# Patient Record
Sex: MALE | NOT HISPANIC OR LATINO | Employment: FULL TIME | URBAN - METROPOLITAN AREA
[De-identification: names, ages, dates, MRNs, and addresses within clinical notes are randomized per-mention and may not be internally consistent; named-entity substitution may affect disease eponyms.]

---

## 2017-01-26 ENCOUNTER — HOSPITAL ENCOUNTER (OUTPATIENT)
Dept: CARDIOLOGY | Facility: HOSPITAL | Age: 32
Discharge: HOME | End: 2017-01-26
Attending: INTERNAL MEDICINE | Admitting: INTERNAL MEDICINE

## 2017-01-26 ENCOUNTER — HOSPITAL ENCOUNTER (OUTPATIENT)
Dept: CARDIOLOGY | Facility: HOSPITAL | Age: 32
Discharge: HOME | End: 2017-01-26
Attending: INTERNAL MEDICINE

## 2017-01-26 VITALS
SYSTOLIC BLOOD PRESSURE: 118 MMHG | OXYGEN SATURATION: 98 % | HEART RATE: 83 BPM | RESPIRATION RATE: 18 BRPM | DIASTOLIC BLOOD PRESSURE: 76 MMHG | BODY MASS INDEX: 36.45 KG/M2 | HEIGHT: 73 IN | WEIGHT: 275 LBS

## 2017-01-26 VITALS
WEIGHT: 275 LBS | BODY MASS INDEX: 36.45 KG/M2 | HEIGHT: 73 IN | HEART RATE: 83 BPM | SYSTOLIC BLOOD PRESSURE: 118 MMHG | DIASTOLIC BLOOD PRESSURE: 76 MMHG

## 2017-01-26 DIAGNOSIS — R07.2 PRECORDIAL PAIN: ICD-10-CM

## 2017-01-26 DIAGNOSIS — R94.31 ABNORMAL EKG: ICD-10-CM

## 2017-01-26 PROCEDURE — 93306 TTE W/DOPPLER COMPLETE: CPT | Performed by: INTERNAL MEDICINE

## 2017-01-26 PROCEDURE — 93018 CV STRESS TEST I&R ONLY: CPT | Performed by: INTERNAL MEDICINE

## 2017-01-26 PROCEDURE — 93016 CV STRESS TEST SUPVJ ONLY: CPT | Performed by: INTERNAL MEDICINE

## 2017-01-26 PROCEDURE — 93306 TTE W/DOPPLER COMPLETE: CPT

## 2017-01-26 PROCEDURE — 93017 CV STRESS TEST TRACING ONLY: CPT

## 2017-01-27 LAB
BH CV ECHO MEAS - ACS: 1.7 CM
BH CV ECHO MEAS - AO MAX PG (FULL): 3.8 MMHG
BH CV ECHO MEAS - AO MAX PG: 10.1 MMHG
BH CV ECHO MEAS - AO MEAN PG (FULL): 3 MMHG
BH CV ECHO MEAS - AO MEAN PG: 6 MMHG
BH CV ECHO MEAS - AO ROOT AREA (BSA CORRECTED): 1.3
BH CV ECHO MEAS - AO ROOT AREA: 8 CM^2
BH CV ECHO MEAS - AO ROOT DIAM: 3.2 CM
BH CV ECHO MEAS - AO V2 MAX: 159 CM/SEC
BH CV ECHO MEAS - AO V2 MEAN: 113 CM/SEC
BH CV ECHO MEAS - AO V2 VTI: 26.6 CM
BH CV ECHO MEAS - AVA(I,A): 2.9 CM^2
BH CV ECHO MEAS - AVA(I,D): 2.9 CM^2
BH CV ECHO MEAS - AVA(V,A): 2.2 CM^2
BH CV ECHO MEAS - AVA(V,D): 2.2 CM^2
BH CV ECHO MEAS - BSA(HAYCOCK): 2.6 M^2
BH CV ECHO MEAS - BSA: 2.5 M^2
BH CV ECHO MEAS - BZI_BMI: 36.3 KILOGRAMS/M^2
BH CV ECHO MEAS - BZI_DIASTOLIC_PRESSURE: 76 MMHG
BH CV ECHO MEAS - BZI_HEART_RATE: 83 BPM
BH CV ECHO MEAS - BZI_METRIC_HEIGHT: 185.4 CM
BH CV ECHO MEAS - BZI_METRIC_WEIGHT: 124.7 KG
BH CV ECHO MEAS - BZI_SYSTOLIC_PRESSURE: 118 MMHG
BH CV ECHO MEAS - CONTRAST EF 4CH: 54.5 ML/M^2
BH CV ECHO MEAS - EDV(CUBED): 22 ML
BH CV ECHO MEAS - EDV(MOD-SP4): 66 ML
BH CV ECHO MEAS - EDV(TEICH): 29.6 ML
BH CV ECHO MEAS - EF(CUBED): 69.2 %
BH CV ECHO MEAS - EF(MOD-SP4): 54.5 %
BH CV ECHO MEAS - EF(TEICH): 62.7 %
BH CV ECHO MEAS - ESV(CUBED): 6.8 ML
BH CV ECHO MEAS - ESV(MOD-SP4): 30 ML
BH CV ECHO MEAS - ESV(TEICH): 11 ML
BH CV ECHO MEAS - FS: 32.5 %
BH CV ECHO MEAS - IVS/LVPW: 0.92
BH CV ECHO MEAS - IVSD: 1.2 CM
BH CV ECHO MEAS - LA DIMENSION: 2.6 CM
BH CV ECHO MEAS - LA/AO: 0.81
BH CV ECHO MEAS - LAT PEAK E' VEL: 17 CM/SEC
BH CV ECHO MEAS - LV DIASTOLIC VOL/BSA (35-75): 26.8 ML/M^2
BH CV ECHO MEAS - LV MASS(C)D: 106.2 GRAMS
BH CV ECHO MEAS - LV MASS(C)DI: 43.1 GRAMS/M^2
BH CV ECHO MEAS - LV MAX PG: 6.4 MMHG
BH CV ECHO MEAS - LV MEAN PG: 3 MMHG
BH CV ECHO MEAS - LV SYSTOLIC VOL/BSA (12-30): 12.2 ML/M^2
BH CV ECHO MEAS - LV V1 MAX: 126 CM/SEC
BH CV ECHO MEAS - LV V1 MEAN: 80.6 CM/SEC
BH CV ECHO MEAS - LV V1 VTI: 26.8 CM
BH CV ECHO MEAS - LVIDD: 2.8 CM
BH CV ECHO MEAS - LVIDS: 1.9 CM
BH CV ECHO MEAS - LVLD AP4: 7.6 CM
BH CV ECHO MEAS - LVLS AP4: 6.7 CM
BH CV ECHO MEAS - LVOT AREA (M): 2.8 CM^2
BH CV ECHO MEAS - LVOT AREA: 2.8 CM^2
BH CV ECHO MEAS - LVOT DIAM: 1.9 CM
BH CV ECHO MEAS - LVPWD: 1.3 CM
BH CV ECHO MEAS - MED PEAK E' VEL: 7 CM/SEC
BH CV ECHO MEAS - MV A DUR: 0.09 SEC
BH CV ECHO MEAS - MV A MAX VEL: 91.8 CM/SEC
BH CV ECHO MEAS - MV DEC SLOPE: 395 CM/SEC^2
BH CV ECHO MEAS - MV DEC TIME: 0.2 SEC
BH CV ECHO MEAS - MV E MAX VEL: 73.1 CM/SEC
BH CV ECHO MEAS - MV E/A: 0.8
BH CV ECHO MEAS - MV MAX PG: 2.5 MMHG
BH CV ECHO MEAS - MV MEAN PG: 1 MMHG
BH CV ECHO MEAS - MV P1/2T MAX VEL: 79.5 CM/SEC
BH CV ECHO MEAS - MV P1/2T: 58.9 MSEC
BH CV ECHO MEAS - MV V2 MAX: 79 CM/SEC
BH CV ECHO MEAS - MV V2 MEAN: 52.4 CM/SEC
BH CV ECHO MEAS - MV V2 VTI: 18.6 CM
BH CV ECHO MEAS - MVA P1/2T LCG: 2.8 CM^2
BH CV ECHO MEAS - MVA(P1/2T): 3.7 CM^2
BH CV ECHO MEAS - MVA(VTI): 4.1 CM^2
BH CV ECHO MEAS - PA MAX PG: 6.7 MMHG
BH CV ECHO MEAS - PA MEAN PG: 3 MMHG
BH CV ECHO MEAS - PA V2 MAX: 127 CM/SEC
BH CV ECHO MEAS - PA V2 MEAN: 74.6 CM/SEC
BH CV ECHO MEAS - PA V2 VTI: 18 CM
BH CV ECHO MEAS - PULM A REVS DUR: 0.1 SEC
BH CV ECHO MEAS - PULM A REVS VEL: 39.4 CM/SEC
BH CV ECHO MEAS - PULM DIAS VEL: 56.3 CM/SEC
BH CV ECHO MEAS - PULM S/D: 1.6
BH CV ECHO MEAS - PULM SYS VEL: 88.3 CM/SEC
BH CV ECHO MEAS - RAP SYSTOLE: 10 MMHG
BH CV ECHO MEAS - RVSP: 26 MMHG
BH CV ECHO MEAS - SI(AO): 86.8 ML/M^2
BH CV ECHO MEAS - SI(CUBED): 6.2 ML/M^2
BH CV ECHO MEAS - SI(LVOT): 30.8 ML/M^2
BH CV ECHO MEAS - SI(MOD-SP4): 14.6 ML/M^2
BH CV ECHO MEAS - SI(TEICH): 7.5 ML/M^2
BH CV ECHO MEAS - SV(AO): 213.9 ML
BH CV ECHO MEAS - SV(CUBED): 15.2 ML
BH CV ECHO MEAS - SV(LVOT): 76 ML
BH CV ECHO MEAS - SV(MOD-SP4): 36 ML
BH CV ECHO MEAS - SV(TEICH): 18.5 ML
BH CV ECHO MEAS - TAPSE (>1.6): 2.9 CM2
BH CV ECHO MEAS - TR MAX VEL: 200 CM/SEC
BH CV STRESS BP STAGE 1: NORMAL
BH CV STRESS BP STAGE 2: NORMAL
BH CV STRESS BP STAGE 3: NORMAL
BH CV STRESS BP STAGE 4: NORMAL
BH CV STRESS DURATION MIN STAGE 1: 3
BH CV STRESS DURATION MIN STAGE 2: 3
BH CV STRESS DURATION MIN STAGE 3: 3
BH CV STRESS DURATION MIN STAGE 4: 1
BH CV STRESS DURATION SEC STAGE 1: 0
BH CV STRESS DURATION SEC STAGE 2: 0
BH CV STRESS DURATION SEC STAGE 3: 0
BH CV STRESS DURATION SEC STAGE 4: 1
BH CV STRESS GRADE STAGE 1: 10
BH CV STRESS GRADE STAGE 2: 12
BH CV STRESS GRADE STAGE 3: 14
BH CV STRESS GRADE STAGE 4: 16
BH CV STRESS HR STAGE 1: 130
BH CV STRESS HR STAGE 2: 153
BH CV STRESS HR STAGE 3: 176
BH CV STRESS HR STAGE 4: 166
BH CV STRESS METS STAGE 1: 4.6
BH CV STRESS METS STAGE 2: 7
BH CV STRESS METS STAGE 3: 10
BH CV STRESS METS STAGE 4: 11.7
BH CV STRESS O2 STAGE 4: 98
BH CV STRESS PROTOCOL 1: NORMAL
BH CV STRESS RECOVERY BP: NORMAL MMHG
BH CV STRESS RECOVERY HR: 110 BPM
BH CV STRESS RECOVERY O2: 98 %
BH CV STRESS SPEED STAGE 1: 1.7
BH CV STRESS SPEED STAGE 2: 2.5
BH CV STRESS SPEED STAGE 3: 3.4
BH CV STRESS SPEED STAGE 4: 4.2
BH CV STRESS STAGE 1: 1
BH CV STRESS STAGE 2: 2
BH CV STRESS STAGE 3: 3
BH CV STRESS STAGE 4: 4
BH CV XLRA - RV BASE: 2.5 CM
BH CV XLRA - RV LENGTH: 7.7 CM
BH CV XLRA - RV MID: 2.3 CM
E/E' RATIO: 7
LEFT ATRIUM VOLUME INDEX: 23 ML/M2
MAXIMAL PREDICTED HEART RATE: 191 BPM
PERCENT MAX PREDICTED HR: 86.91 %
STRESS BASELINE BP: NORMAL MMHG
STRESS BASELINE HR: 83 BPM
STRESS O2 SAT REST: 98 %
STRESS PERCENT HR: 102 %
STRESS POST ESTIMATED WORKLOAD: 11.7 METS
STRESS POST EXERCISE DUR MIN: 10 MIN
STRESS POST EXERCISE DUR SEC: 1 SEC
STRESS POST O2 SAT PEAK: 98 %
STRESS POST PEAK BP: NORMAL MMHG
STRESS POST PEAK HR: 166 BPM
STRESS TARGET HR: 162 BPM

## 2017-02-14 ENCOUNTER — OFFICE VISIT (OUTPATIENT)
Dept: CARDIOLOGY | Facility: CLINIC | Age: 32
End: 2017-02-14

## 2017-02-14 VITALS
DIASTOLIC BLOOD PRESSURE: 82 MMHG | OXYGEN SATURATION: 97 % | HEART RATE: 67 BPM | HEIGHT: 73 IN | WEIGHT: 262 LBS | SYSTOLIC BLOOD PRESSURE: 108 MMHG | BODY MASS INDEX: 34.72 KG/M2

## 2017-02-14 DIAGNOSIS — R07.2 PRECORDIAL PAIN: Primary | ICD-10-CM

## 2017-02-14 DIAGNOSIS — R94.31 ABNORMAL EKG: ICD-10-CM

## 2017-02-14 PROCEDURE — 99213 OFFICE O/P EST LOW 20 MIN: CPT | Performed by: INTERNAL MEDICINE

## 2017-02-14 RX ORDER — VARENICLINE TARTRATE 1 MG/1
0.5 TABLET, FILM COATED ORAL DAILY
COMMUNITY
Start: 2016-11-14

## 2017-02-14 NOTE — PROGRESS NOTES
Subjective:       Mustapha Pagan is a 31 y.o. male who here for follow up    CC  Follow-up after the stress test and echocardiogram  HPI  31-year-old male with a strong family history with atypical symptoms underwent a stress test and echocardiogram, symptoms as markedly improved with no more chest pains or tightness in chest, patient had a stress test and echo done couple weeks ago     Problem List Items Addressed This Visit        Nervous and Auditory    Precordial pain - Primary       Other    Abnormal EKG        Previous treatments/evaluations include: . Cardiac risk factors: .    The following portions of the patient's history were reviewed and updated as appropriate: allergies, current medications, past family history, past medical history, past social history, past surgical history and problem list.    Past Medical History   Diagnosis Date   • Abnormal ECG    • Back pain    • LFT elevation    • Low HDL (under 40)    • Mood swings    • Seasonal allergies    • Skull fracture     reports that he has been smoking.  He has a 7.00 pack-year smoking history. He has never used smokeless tobacco. He reports that he drinks alcohol. He reports that he does not use illicit drugs.  Family History   Problem Relation Age of Onset   • Heart disease Sister    • Heart disease Brother    • Seizures Brother    • Cancer Maternal Aunt    • Heart disease Maternal Grandfather    • Heart disease Paternal Grandfather    • Hypertension Mother    • Hyperlipidemia Mother    • Hypertension Father    • COPD Father    • Heart disease Paternal Uncle    • Heart failure Paternal Uncle    • Hyperlipidemia Paternal Uncle    • Hypertension Paternal Uncle        Review of Systems  Constitutional: No wt loss, fever, fatigue  Gastrointestinal: No nausea, abdominal pain  Behavioral/Psych: No insomnia or anxiety   Cardiovascular chest pain  Objective:       Physical Exam           Physical Exam  Visit Vitals   • /82 (BP Location: Right arm)  "  • Pulse 67   • Ht 73\" (185.4 cm)   • Wt 262 lb (119 kg)   • SpO2 97%   • BMI 34.57 kg/m2       General appearance: NAD, conversant   Eyes: anicteric sclerae, moist conjunctivae; no lid-lag; PERRLA   HENT: Atraumatic; oropharynx clear with moist mucous membranes and no mucosal ulcerations;  normal hard and soft palate   Neck: Trachea midline; FROM, supple, no thyromegaly or lymphadenopathy   Lungs: CTA, with normal respiratory effort and no intercostal retractions   CV: S1-S2 regular, no murmurs, no rub, no gallop   Abdomen: Soft, non-tender; no masses or HSM   Extremities: No peripheral edema or extremity lymphadenopathy  Skin: Normal temperature, turgor and texture; no rash, ulcers or subcutaneous nodules   Psych: Appropriate affect, alert and oriented to person, place and time           Cardiographics  @Procedures    Echocardiogram:        Current Outpatient Prescriptions:   •  CHANTIX CONTINUING MONTH KAITLYN 1 MG tablet, Take 0.5 mg by mouth Daily., Disp: , Rfl:   •  CHANTIX STARTING MONTH KAITLYN 0.5 MG X 11 & 1 MG X 42 tablet, Take 1 mg by mouth Every 12 (Twelve) Hours., Disp: , Rfl:    Assessment:        Patient Active Problem List   Diagnosis   • Abnormal EKG   • Precordial pain   Asymptomatic for chest pain. ECG is negative for ischemia.   Ectopy:None   B/P response Hypertensive, returns to baseline in recovery  Exercise tolerance is good.   Goodwin treadmill score 9 Estimates an annual cardiovascular mortality of 0 % And of 5-year survival of 96 % . Using the Duke score there is a low probability of angiographic coronary disease      Interpretation Summary   · All left ventricular wall segments contract normally.  · Left Ventricle: Calculated EF = 54.5%.  · Trace-to-mild mitral valve regurgitation  · There is no evidence of pericardial effusion   Specificity and sensitivity of the stress test has been explained. Pt has been explained if  Symptoms continue please go to ER, and further w/p will be required.          " Plan:            ICD-10-CM ICD-9-CM   1. Precordial pain R07.2 786.51   2. Abnormal EKG R94.31 794.31     Cp much better  No further w/p at this stage     See us 2-5 yrs      COUNSELING:    Mustapha Crespo was given to patient for the following topics: diagnostic results, risk factor reductions, impressions, risks and benefits of treatment options and importance of treatment compliance .       SMOKING COUNSELING:    Ready to quit: Not Answered  Counseling given: Not Answered      EMR Dragon/Transcription disclaimer:   Much of this encounter note is an electronic transcription/translation of spoken language to printed text. The electronic translation of spoken language may permit erroneous, or at times, nonsensical words or phrases to be inadvertently transcribed; Although I have reviewed the note for such errors, some may still exist.

## 2019-01-16 ENCOUNTER — OFFICE VISIT CONVERTED (OUTPATIENT)
Dept: FAMILY MEDICINE CLINIC | Facility: CLINIC | Age: 34
End: 2019-01-16
Attending: FAMILY MEDICINE

## 2019-01-22 ENCOUNTER — HOSPITAL ENCOUNTER (OUTPATIENT)
Dept: GENERAL RADIOLOGY | Facility: HOSPITAL | Age: 34
Discharge: HOME | End: 2019-01-22
Attending: FAMILY MEDICINE

## 2019-02-06 ENCOUNTER — OFFICE VISIT CONVERTED (OUTPATIENT)
Dept: FAMILY MEDICINE CLINIC | Facility: CLINIC | Age: 34
End: 2019-02-06
Attending: FAMILY MEDICINE

## 2019-04-09 ENCOUNTER — HOSPITAL ENCOUNTER (OUTPATIENT)
Dept: FAMILY MEDICINE CLINIC | Facility: CLINIC | Age: 34
Discharge: HOME | End: 2019-04-09
Attending: FAMILY MEDICINE

## 2019-04-09 ENCOUNTER — CONVERSION ENCOUNTER (OUTPATIENT)
Dept: OTHER | Facility: HOSPITAL | Age: 34
End: 2019-04-09

## 2019-04-09 LAB
ALBUMIN SERPL-MCNC: 4.2 G/DL (ref 3.5–5)
ALBUMIN/GLOB SERPL: 1.5 {RATIO} (ref 1.4–2.6)
ALP SERPL-CCNC: 154 U/L (ref 53–128)
ALT SERPL-CCNC: 70 U/L (ref 10–40)
ANION GAP SERPL CALC-SCNC: 15 MMOL/L (ref 8–19)
APPEARANCE UR: CLEAR
AST SERPL-CCNC: 41 U/L (ref 15–50)
BASOPHILS # BLD AUTO: 0.07 10*3/UL (ref 0–0.2)
BASOPHILS NFR BLD AUTO: 1 % (ref 0–3)
BILIRUB SERPL-MCNC: 0.38 MG/DL (ref 0.2–1.3)
BILIRUB UR QL: NEGATIVE
BUN SERPL-MCNC: 13 MG/DL (ref 5–25)
BUN/CREAT SERPL: 10 {RATIO} (ref 6–20)
CALCIUM SERPL-MCNC: 9 MG/DL (ref 8.7–10.4)
CHLORIDE SERPL-SCNC: 104 MMOL/L (ref 99–111)
CHOLEST SERPL-MCNC: 169 MG/DL (ref 107–200)
CHOLEST/HDLC SERPL: 6.8 {RATIO} (ref 3–6)
COLOR UR: YELLOW
CONV ABS IMM GRAN: 0.01 10*3/UL (ref 0–0.2)
CONV CO2: 25 MMOL/L (ref 22–32)
CONV COLLECTION SOURCE (UA): NORMAL
CONV HIV-1/ HIV-2: NEGATIVE
CONV IMMATURE GRAN: 0.1 % (ref 0–1.8)
CONV TOTAL PROTEIN: 7 G/DL (ref 6.3–8.2)
CONV UROBILINOGEN IN URINE BY AUTOMATED TEST STRIP: 0.2 {EHRLICHU}/DL (ref 0.1–1)
CREAT UR-MCNC: 1.25 MG/DL (ref 0.7–1.2)
DEPRECATED RDW RBC AUTO: 44 FL (ref 35.1–43.9)
EOSINOPHIL # BLD AUTO: 0.34 10*3/UL (ref 0–0.7)
EOSINOPHIL # BLD AUTO: 4.9 % (ref 0–7)
ERYTHROCYTE [DISTWIDTH] IN BLOOD BY AUTOMATED COUNT: 13.2 % (ref 11.6–14.4)
ERYTHROCYTE [SEDIMENTATION RATE] IN BLOOD: 6 MM/H (ref 0–20)
EST. AVERAGE GLUCOSE BLD GHB EST-MCNC: 137 MG/DL
GFR SERPLBLD BASED ON 1.73 SQ M-ARVRAT: >60 ML/MIN/{1.73_M2}
GLOBULIN UR ELPH-MCNC: 2.8 G/DL (ref 2–3.5)
GLUCOSE SERPL-MCNC: 126 MG/DL (ref 70–99)
GLUCOSE UR QL: NEGATIVE MG/DL
HBA1C MFR BLD: 15.2 G/DL (ref 14–18)
HBA1C MFR BLD: 6.4 % (ref 3.5–5.7)
HCT VFR BLD AUTO: 45.1 % (ref 42–52)
HDLC SERPL-MCNC: 25 MG/DL (ref 40–60)
HGB UR QL STRIP: NEGATIVE
KETONES UR QL STRIP: NEGATIVE MG/DL
LDLC SERPL CALC-MCNC: 94 MG/DL (ref 70–100)
LEUKOCYTE ESTERASE UR QL STRIP: NEGATIVE
LYMPHOCYTES # BLD AUTO: 2.66 10*3/UL (ref 1–5)
MCH RBC QN AUTO: 30.8 PG (ref 27–31)
MCHC RBC AUTO-ENTMCNC: 33.7 G/DL (ref 33–37)
MCV RBC AUTO: 91.3 FL (ref 80–96)
MONOCYTES # BLD AUTO: 0.53 10*3/UL (ref 0.2–1.2)
MONOCYTES NFR BLD AUTO: 7.6 % (ref 3–10)
NEUTROPHILS # BLD AUTO: 3.39 10*3/UL (ref 2–8)
NEUTROPHILS NFR BLD AUTO: 48.4 % (ref 30–85)
NITRITE UR QL STRIP: NEGATIVE
NRBC CBCN: 0 % (ref 0–0.7)
OSMOLALITY SERPL CALC.SUM OF ELEC: 292 MOSM/KG (ref 273–304)
PH UR STRIP.AUTO: 5 [PH] (ref 5–8)
PLATELET # BLD AUTO: 143 10*3/UL (ref 130–400)
PMV BLD AUTO: 11.2 FL (ref 9.4–12.4)
POTASSIUM SERPL-SCNC: 3.9 MMOL/L (ref 3.5–5.3)
PROT UR QL: NEGATIVE MG/DL
RBC # BLD AUTO: 4.94 10*6/UL (ref 4.7–6.1)
SODIUM SERPL-SCNC: 140 MMOL/L (ref 135–147)
SP GR UR: 1.02 (ref 1–1.03)
TRIGL SERPL-MCNC: 248 MG/DL (ref 40–150)
TSH SERPL-ACNC: 2.22 M[IU]/L (ref 0.27–4.2)
VARIANT LYMPHS NFR BLD MANUAL: 38 % (ref 20–45)
VLDLC SERPL-MCNC: 50 MG/DL (ref 5–37)
WBC # BLD AUTO: 7 10*3/UL (ref 4.8–10.8)

## 2019-04-16 LAB
CONV HEPATITIS B SURFACE AG W CONFIRMATION RE: NEGATIVE
CONV HEPATITIS C TEST INFO: NORMAL
HAV IGM SERPL QL IA: NEGATIVE
HBV CORE IGM SERPL QL IA: NEGATIVE
HCV AB SER DONR QL: <0.1 S/CO RATIO (ref 0–0.9)
HCV RNA SERPL NAA+PROBE-ACNC: NORMAL IU/ML

## 2019-04-24 ENCOUNTER — OFFICE VISIT CONVERTED (OUTPATIENT)
Dept: FAMILY MEDICINE CLINIC | Facility: CLINIC | Age: 34
End: 2019-04-24
Attending: FAMILY MEDICINE

## 2019-04-30 ENCOUNTER — HOSPITAL ENCOUNTER (OUTPATIENT)
Dept: NEUROLOGY | Facility: HOSPITAL | Age: 34
Discharge: HOME | End: 2019-04-30

## 2019-06-05 ENCOUNTER — OFFICE VISIT CONVERTED (OUTPATIENT)
Dept: FAMILY MEDICINE CLINIC | Facility: CLINIC | Age: 34
End: 2019-06-05
Attending: FAMILY MEDICINE

## 2020-02-25 ENCOUNTER — OFFICE VISIT CONVERTED (OUTPATIENT)
Dept: FAMILY MEDICINE CLINIC | Facility: CLINIC | Age: 35
End: 2020-02-25
Attending: FAMILY MEDICINE

## 2020-02-25 ENCOUNTER — CONVERSION ENCOUNTER (OUTPATIENT)
Dept: FAMILY MEDICINE CLINIC | Facility: CLINIC | Age: 35
End: 2020-02-25

## 2020-03-09 ENCOUNTER — HOSPITAL ENCOUNTER (OUTPATIENT)
Dept: FAMILY MEDICINE CLINIC | Facility: CLINIC | Age: 35
Discharge: HOME | End: 2020-03-09
Attending: FAMILY MEDICINE

## 2020-03-09 LAB
ALBUMIN SERPL-MCNC: 4.4 G/DL (ref 3.5–5)
ALBUMIN/GLOB SERPL: 1.5 {RATIO} (ref 1.4–2.6)
ALP SERPL-CCNC: 147 U/L (ref 53–128)
ALT SERPL-CCNC: 69 U/L (ref 10–40)
ANION GAP SERPL CALC-SCNC: 15 MMOL/L (ref 8–19)
AST SERPL-CCNC: 38 U/L (ref 15–50)
BILIRUB SERPL-MCNC: 0.59 MG/DL (ref 0.2–1.3)
BUN SERPL-MCNC: 12 MG/DL (ref 5–25)
BUN/CREAT SERPL: 10 {RATIO} (ref 6–20)
CALCIUM SERPL-MCNC: 9.4 MG/DL (ref 8.7–10.4)
CHLORIDE SERPL-SCNC: 103 MMOL/L (ref 99–111)
CHOLEST SERPL-MCNC: 162 MG/DL (ref 107–200)
CHOLEST/HDLC SERPL: 7 {RATIO} (ref 3–6)
CONV CO2: 27 MMOL/L (ref 22–32)
CONV TOTAL PROTEIN: 7.4 G/DL (ref 6.3–8.2)
CREAT UR-MCNC: 1.18 MG/DL (ref 0.7–1.2)
EST. AVERAGE GLUCOSE BLD GHB EST-MCNC: 146 MG/DL
GFR SERPLBLD BASED ON 1.73 SQ M-ARVRAT: >60 ML/MIN/{1.73_M2}
GLOBULIN UR ELPH-MCNC: 3 G/DL (ref 2–3.5)
GLUCOSE SERPL-MCNC: 123 MG/DL (ref 70–99)
HBA1C MFR BLD: 6.7 % (ref 3.5–5.7)
HDLC SERPL-MCNC: 23 MG/DL (ref 40–60)
LDLC SERPL CALC-MCNC: 105 MG/DL (ref 70–100)
OSMOLALITY SERPL CALC.SUM OF ELEC: 293 MOSM/KG (ref 273–304)
POTASSIUM SERPL-SCNC: 4.1 MMOL/L (ref 3.5–5.3)
SODIUM SERPL-SCNC: 141 MMOL/L (ref 135–147)
TRIGL SERPL-MCNC: 171 MG/DL (ref 40–150)
VLDLC SERPL-MCNC: 34 MG/DL (ref 5–37)

## 2020-03-10 ENCOUNTER — OFFICE VISIT CONVERTED (OUTPATIENT)
Dept: FAMILY MEDICINE CLINIC | Facility: CLINIC | Age: 35
End: 2020-03-10
Attending: FAMILY MEDICINE

## 2020-06-16 ENCOUNTER — OFFICE VISIT CONVERTED (OUTPATIENT)
Dept: FAMILY MEDICINE CLINIC | Facility: CLINIC | Age: 35
End: 2020-06-16
Attending: FAMILY MEDICINE

## 2020-08-05 ENCOUNTER — OFFICE VISIT CONVERTED (OUTPATIENT)
Dept: FAMILY MEDICINE CLINIC | Facility: CLINIC | Age: 35
End: 2020-08-05
Attending: FAMILY MEDICINE

## 2020-08-05 ENCOUNTER — HOSPITAL ENCOUNTER (OUTPATIENT)
Dept: FAMILY MEDICINE CLINIC | Facility: CLINIC | Age: 35
Discharge: HOME | End: 2020-08-05
Attending: FAMILY MEDICINE

## 2020-08-05 LAB
ALBUMIN SERPL-MCNC: 4.3 G/DL (ref 3.5–5)
ALBUMIN/GLOB SERPL: 1.4 {RATIO} (ref 1.4–2.6)
ALP SERPL-CCNC: 184 U/L (ref 53–128)
ALT SERPL-CCNC: 45 U/L (ref 10–40)
ANION GAP SERPL CALC-SCNC: 14 MMOL/L (ref 8–19)
AST SERPL-CCNC: 30 U/L (ref 15–50)
BASOPHILS # BLD AUTO: 0.09 10*3/UL (ref 0–0.2)
BASOPHILS NFR BLD AUTO: 1.2 % (ref 0–3)
BILIRUB SERPL-MCNC: 0.35 MG/DL (ref 0.2–1.3)
BUN SERPL-MCNC: 7 MG/DL (ref 5–25)
BUN/CREAT SERPL: 6 {RATIO} (ref 6–20)
CALCIUM SERPL-MCNC: 9.7 MG/DL (ref 8.7–10.4)
CHLORIDE SERPL-SCNC: 104 MMOL/L (ref 99–111)
CONV ABS IMM GRAN: 0.01 10*3/UL (ref 0–0.2)
CONV CO2: 28 MMOL/L (ref 22–32)
CONV CREATININE URINE, RANDOM: 477 MG/DL (ref 10–300)
CONV IMMATURE GRAN: 0.1 % (ref 0–1.8)
CONV MICROALBUM.,U,RANDOM: <12 MG/L (ref 0–20)
CONV TOTAL PROTEIN: 7.3 G/DL (ref 6.3–8.2)
CREAT UR-MCNC: 1.19 MG/DL (ref 0.7–1.2)
DEPRECATED RDW RBC AUTO: 46.1 FL (ref 35.1–43.9)
EOSINOPHIL # BLD AUTO: 0.22 10*3/UL (ref 0–0.7)
EOSINOPHIL # BLD AUTO: 3 % (ref 0–7)
ERYTHROCYTE [DISTWIDTH] IN BLOOD BY AUTOMATED COUNT: 13.7 % (ref 11.6–14.4)
GFR SERPLBLD BASED ON 1.73 SQ M-ARVRAT: >60 ML/MIN/{1.73_M2}
GLOBULIN UR ELPH-MCNC: 3 G/DL (ref 2–3.5)
GLUCOSE SERPL-MCNC: 120 MG/DL (ref 70–99)
HCT VFR BLD AUTO: 48.3 % (ref 42–52)
HGB BLD-MCNC: 15.8 G/DL (ref 14–18)
LYMPHOCYTES # BLD AUTO: 1.95 10*3/UL (ref 1–5)
LYMPHOCYTES NFR BLD AUTO: 26.5 % (ref 20–45)
MCH RBC QN AUTO: 30 PG (ref 27–31)
MCHC RBC AUTO-ENTMCNC: 32.7 G/DL (ref 33–37)
MCV RBC AUTO: 91.8 FL (ref 80–96)
MICROALBUMIN/CREAT UR: 2.5 MG/G{CRE} (ref 0–25)
MONOCYTES # BLD AUTO: 0.5 10*3/UL (ref 0.2–1.2)
MONOCYTES NFR BLD AUTO: 6.8 % (ref 3–10)
NEUTROPHILS # BLD AUTO: 4.59 10*3/UL (ref 2–8)
NEUTROPHILS NFR BLD AUTO: 62.4 % (ref 30–85)
NRBC CBCN: 0 % (ref 0–0.7)
OSMOLALITY SERPL CALC.SUM OF ELEC: 293 MOSM/KG (ref 273–304)
PLATELET # BLD AUTO: 162 10*3/UL (ref 130–400)
PMV BLD AUTO: 11.5 FL (ref 9.4–12.4)
POTASSIUM SERPL-SCNC: 3.9 MMOL/L (ref 3.5–5.3)
RBC # BLD AUTO: 5.26 10*6/UL (ref 4.7–6.1)
SODIUM SERPL-SCNC: 142 MMOL/L (ref 135–147)
WBC # BLD AUTO: 7.36 10*3/UL (ref 4.8–10.8)

## 2020-08-06 LAB
CHOLEST SERPL-MCNC: 159 MG/DL (ref 107–200)
CHOLEST/HDLC SERPL: 4.7 {RATIO} (ref 3–6)
EST. AVERAGE GLUCOSE BLD GHB EST-MCNC: 114 MG/DL
GGT SERPL-CCNC: 46 U/L (ref 8–78)
HBA1C MFR BLD: 5.6 % (ref 3.5–5.7)
HBV SURFACE AB SER QL: NON REACTIVE
HDLC SERPL-MCNC: 34 MG/DL (ref 40–60)
LDLC SERPL CALC-MCNC: 108 MG/DL (ref 70–100)
TRIGL SERPL-MCNC: 87 MG/DL (ref 40–150)
VLDLC SERPL-MCNC: 17 MG/DL (ref 5–37)

## 2020-08-21 ENCOUNTER — HOSPITAL ENCOUNTER (OUTPATIENT)
Dept: URGENT CARE | Facility: CLINIC | Age: 35
Discharge: HOME | End: 2020-08-21

## 2020-08-23 LAB — SARS-COV-2 RNA SPEC QL NAA+PROBE: NOT DETECTED

## 2020-09-01 ENCOUNTER — CONVERSION ENCOUNTER (OUTPATIENT)
Dept: OTHER | Facility: HOSPITAL | Age: 35
End: 2020-09-01

## 2020-09-01 ENCOUNTER — OFFICE VISIT CONVERTED (OUTPATIENT)
Dept: NEUROLOGY | Facility: CLINIC | Age: 35
End: 2020-09-01
Attending: NURSE PRACTITIONER

## 2020-09-01 ENCOUNTER — HOSPITAL ENCOUNTER (OUTPATIENT)
Dept: URGENT CARE | Facility: CLINIC | Age: 35
Discharge: HOME | End: 2020-09-01
Attending: PHYSICIAN ASSISTANT

## 2020-09-04 LAB — SARS-COV-2 RNA SPEC QL NAA+PROBE: NOT DETECTED

## 2020-09-22 ENCOUNTER — HOSPITAL ENCOUNTER (OUTPATIENT)
Dept: MRI IMAGING | Facility: HOSPITAL | Age: 35
Discharge: HOME | End: 2020-09-22
Attending: NURSE PRACTITIONER

## 2020-10-22 ENCOUNTER — HOSPITAL ENCOUNTER (OUTPATIENT)
Dept: NEUROLOGY | Facility: HOSPITAL | Age: 35
Discharge: HOME | End: 2020-10-22
Attending: NURSE PRACTITIONER

## 2021-05-13 NOTE — PROGRESS NOTES
Progress Note      Patient Name: Mustapha Pagan II   Patient ID: [id number]   Sex: Male   YOB: 1985    Primary Care Provider: Mitesh Wiley DO   Referring Provider: Mitesh Wiley DO    Visit Date: August 5, 2020    Provider: Mitesh Wiley DO   Location: [facility]   Location Address: [address] [place] [address], [address]  [id number]   Location Phone: [phone]          Chief Complaint  · 1 mo      History Of Present Illness  Mustapha Pagan II is a 35 year old /White male who presents for evaluation and treatment of:      For 1 month follow-up.  He is still not gotten his labs done which we will get done today prior to him leaving.  He continues to lose weight which is intentional.  He is down another 14 pounds and overall is down from 308 pounds down to 255.  He still working on losing more weight.  He is feeling better.  Unfortunately 1 week ago on 7/29/2020 around 10 PM when he was sitting on the couch resting.  He admits he was under a lot of stress.  He reports that the next thing he knew he was being woken up on his side.  He reports that witnesses saw that he was shaking uncontrollably for about 2 minutes.  This is not previously happened to him but he has had staring spells.  Due to the staring spell concern he previously was seen by Dr. Petty in [place].  He had evaluation for this including an MRI with and without contrast that was normal.  He also had an EEG which was normal.  This is new for him.  He has not had any recurrence of this.  I discussed with him referral again to neurology to reassess this as these are new symptoms for him.  I discussed with him in the meantime he is to not drive until he has been evaluated by neurology and has received the recommendations.  He does have a history of a TBI.       Past Medical History  Allergies; Colon cancer screening; Forgetfulness; Head injury; Hemorrhoids; Migraine headache; Shortness of Breath  "        Past Surgical History  Gallbladder removal         Allergy List  NO KNOWN DRUG ALLERGIES         Family Medical History  Heart Disease; Diabetes         Social History  Alcohol (Current some day); Tobacco (Current every day)         Immunizations  Name Date Admin   Influenza Refused         Review of Systems     General: Denies any fever, chills.  Intentional weight loss  HEENT:  Denies any vision or hearing changes. Denies any neck tenderness. Denies any headaches. Denies nasal congestion  Cardiovascular: Denies any chest pain or palpitations  respiratory: Denies any cough or wheezing. Denies any shortness of breath  Gastrointestinal: Denies any nausea vomiting or diarrhea, Denies constipation  Extremities: Denies any edema  Psychiatric: Denies any changes in mood or affect  Neurologic: As discussed above  skin: Denies any rashes or lesions.  Musculoskeletal: Denies any weakness       Vitals  Date Time BP Position Site L\R Cuff Size HR RR TEMP (F) WT  HT  BMI kg/m2 BSA m2 O2 Sat HC       08/05/2020 10:49 /82 Sitting    64 - R  97.6 255lbs 0oz 6'  1\" 33.64 2.44 100 %          Physical Examination     General: AAO 3, no acute distress, pleasant  HEENT: Normocephalic, atraumatic  Cardiovascular: Regular rate and rhythm without appreciable murmur  Respiratory: Clear to auscultation bilaterally no RRW  Gastrointestinal: Soft nontender nondistended with bowel sounds present  extremities: No clubbing, cyanosis or edema  Neurologic: CN II through XII grossly intact   Psychiatric: Normal mood and affect           Assessment  · Seizure disorder     345.90/G40.909  · History of seizure     V12.49/Z87.898  · TBI (traumatic brain injury)     854.00/S06.9X9A    Problems Reconciled  Plan  · Orders  o ACO-39: Current medications updated and reviewed () - - 08/05/2020  o NEUROLOGY CONSULTATION. (NEURO) - 345.90/G40.909, V12.49/Z87.898 - 08/05/2020   Seizure last tuesday 7/29/2020  · Medications  o Medications " have been Reconciled  o Transition of Care or Provider Policy  · Instructions  o Patient was educated/instructed on their diagnosis, treatment and medications prior to discharge from the clinic today.  o Patient instructed to seek medical attention urgently for new or worsening symptoms.  o Call the office with any concerns or questions.  o Plan as documented above. Patient to call with any questions or concerns. I will see him back in 1 month or sooner if needed. Patient instructed to not drive until he has been seen by neurology. We will hold off on adding medication at this time but if he does have a recurrence he is instructed to call. We can put him on Topamax at that time.  · Disposition  o Follow Up in 1 month.            Electronically Signed by: Mitesh Wiley DO - on August 5, 2020 11:59:23 AM

## 2021-05-13 NOTE — PROGRESS NOTES
Progress Note      Patient Name: Mustapha Pagan II   Patient ID: [id number]   Sex: Male   YOB: 1985    Primary Care Provider: Mitesh Wiley DO   Referring Provider: Mitesh Wiley DO    Visit Date: September 1, 2020    Provider: CRISTIANA Gutierrez   Location: [facility]   Location Address: [address] [place] [address]  [address] [place] [address]  [id number]   Location Phone: [phone]          Chief Complaint  · Follow Up Exam      History Of Present Illness  Mustapha Pagan II is a 35 year old /White male who presents today to [facility] today for a follow up exam. Had previously been seen in this office by Dr. Petty for staring spells. EEG/MRI was normal. States about 1 month ago he was sitting on his brothers porch and when his brother looked back he was convulsing. Arm was tense and head was shaking. Spell lasted about 1 minute. Endorses post-ictal confusion. Denies urinary incontinence or tongue biting. Having headache about 4 times per week. Denies photophobia, phonophobia or nausea with headaches. Headaches last hours to all day. Previously was on Effexor and he felt that helped to decrease headaches, but he states he wasn't taking it regularly while working out of town and it was discontinued. States that his dad and brother have history of seizure and are on Keppra.       Past Medical History  Allergies; Colon cancer screening; Forgetfulness; Head injury; Hemorrhoids; Migraine headache; Shortness of Breath         Past Surgical History  Gallbladder removal         Allergy List  NO KNOWN DRUG ALLERGIES       Allergies Reconciled  Family Medical History  Heart Disease; Diabetes         Social History  Alcohol (Current some day); Tobacco (Current every day)         Immunizations  Name Date Admin   Influenza Refused         Review of Systems  · Constitutional  o Admits  o : excessive sweating, fatigue, weight loss  o Denies  o : chills, fever,  "sycope/passing out, weight gain  · Eyes  o Admits  o : blurry vision  o Denies  o : changes in vision, double vision  · HENT  o Admits  o : ringing in the ears, sore throat, seasonal allergies  o Denies  o : loss of hearing, ear aches, nasal congestion, sinus pain, nose bleeds  · Cardiovascular  o Denies  o : blood clots, swollen legs, anemia, easy burising or bleeding, transfusions  · Respiratory  o Admits  o : dry cough  o Denies  o : shortness of breath, productive cough, pneumonia, COPD  · Gastrointestinal  o Denies  o : difficulty swallowing, reflux  · Genitourinary  o Denies  o : incontinence  · Neurologic  o Admits  o : headache, seizure, loss of balance, dizziness/vertigo, difficulty with sleep, weakness  o Denies  o : stroke, tremor, falls, numbness/tingling/paresthesia , difficulty with coordination, difficulty with dexterity  · Musculoskeletal  o Admits  o : muscle aches, weakness, spasms, pain radiating in arm  o Denies  o : neck stiffness/pain, swollen lymph nodes, joint pain, sciatica, pain radiating in leg, low back pain  · Endocrine  o Denies  o : diabetes, thyroid disorder  · Psychiatric  o Admits  o : anxiety  o Denies  o : depression      Vitals  Date Time BP Position Site L\R Cuff Size HR RR TEMP (F) WT  HT  BMI kg/m2 BSA m2 O2 Sat        09/01/2020 12:58 /86 Sitting    69 - R  97.1 259lbs 7oz 6'  1\" 34.23 2.46           Physical Examination  · Constitutional  o Appearance  o : well-nourished, well groomed, in no apparent distress  · Neurologic  o Mental Status Examination  o :   § Orientation  § : Alert and oriented to person, place, and time.   § Speech/Language  § : intact naming, comprehension, and repetition. No dysarthria.  § Fund of Knowledge  § : Adequate fund of knowledge.  o Cranial Nerves  o : Pupils are equal, round and reactive to light. Extraocular movements are intact. Visual fields are full. Fundoscopic examination reveals sharp disc bilaterally. Sensation in the V1-V3 " distribution is intact and symmetric. Muscles of mastication are strong and symmetric. Muscles of facial expression are strong and symmetric. Hearing is intact. Palatal raise is intact and symmetric. Uvula is midline. Shoulder shrug is strong. Tongue protrudes in the midline.  o Reflexes  o : 2+ reflexes throughout and symmetric. Negative Staley. Negative Babinski.   o Sensation  o : Intact sensation to light touch, pinprick, vibration and proprioception throughout  o Gait and Station  o : The patient is noted to have a normal, narrow based gait with normal arm swing  o Cerebellar Function  o : intact finger to nose and heel to shin. Rapid alternating movements are intact in the upper and lower extremities.           Assessment  · Seizure-like activity     780.39/R56.9  Will order EEG and MRI brain for further evaluation of seizure-like activity. Discussed routine seizure precautions. Discussed [place] state law regarding no driving for 90 days after a seizure.   · History of traumatic brain injury     V15.52/Z87.820  · Chronic headaches     784.0/R51  Will workup for seizures, then can address chronic migraines in follow-up.     Problems Reconciled  Plan  · Orders  o MRI brain wo then w contrast (21674) - 780.39/R56.9, V15.52/Z87.820, 784.0/R51 - 09/01/2020  o EEG (Sleep Deprived) [facility] ([id number]) - 780.39/R56.9, V15.52/Z87.820, 784.0/R51 - 09/01/2020  · Medications  o Medications have been Reconciled  o Transition of Care or Provider Policy  · Instructions  o Call or Return if symptoms worsen or persist.   o Follow up in 2 months.  · Disposition  o Call or Return if symptoms worsen or persist.            Electronically Signed by: Abi Hay APRN -Author on September 2, 2020 09:22:08 AM

## 2021-05-13 NOTE — PROGRESS NOTES
Progress Note      Patient Name: Mustapha Pagan II   Patient ID: [id number]   Sex: Male   YOB: 1985    Primary Care Provider: Mitesh Wiley DO   Referring Provider: Mitesh Wiley DO    Visit Date: June 16, 2020    Provider: Mitesh Wiley DO   Location: [facility]   Location Address: [address] [place] [address], [address]  [id number]   Location Phone: [phone]          Chief Complaint  · check up      History Of Present Illness  Mustapha Pagan II is a 35 year old /White male who presents for evaluation and treatment of:      Patient presents today for checkup.  Since last time I saw him he has been exercising a lot more.  He has lost 23 pounds since his last visit.  He reports feeling a lot better.  He has been off of the Effexor for about a month and reports he is doing well without it.  Denies any suicidal ideations.  Denies any issues with anger.  He has been focusing more on relaxing and listening to rain and trying to keep calm.  This has been working well for him.  Discussed with him continuing meditation exercises.  He does have labs due.  His last A1c was 6.7%.  Discussed with him that this placed him in the diabetic range.  He has been eating better.  Reports eating more turkey meat and eating more green leafy vegetables.  Discussed holding off on adding any medication at this time pending his results.  Discussed seeing him back in 1 month.       Past Medical History  Allergies; Colon cancer screening; Forgetfulness; Head injury; Hemorrhoids; Migraine headache; Shortness of Breath         Past Surgical History  Gallbladder removal         Allergy List  NO KNOWN DRUG ALLERGIES         Family Medical History  Heart Disease; Diabetes         Social History  Alcohol (Current some day); Tobacco (Current every day)         Immunizations  Name Date Admin   Influenza Refused         Review of Systems     General: Denies any fever, chills.  Intentional weight  "loss  HEENT:  Denies any vision or hearing changes. Denies any neck tenderness. Denies any headaches. Denies nasal congestion  Cardiovascular: Denies any chest pain or palpitations  respiratory: Denies any cough or wheezing. Denies any shortness of breath  Gastrointestinal: Denies any nausea vomiting or diarrhea, Denies constipation  Extremities: Denies any edema  Psychiatric: As described above  Neurologic: Denies any neurologic deficits  skin: Denies any rashes or lesions.  endocrine: Denies any weight loss, fever, night sweats  Musculoskeletal: Denies any weakness       Vitals  Date Time BP Position Site L\R Cuff Size HR RR TEMP (F) WT  HT  BMI kg/m2 BSA m2 O2 Sat HC       06/16/2020 01:36 /78 Sitting    68 - R  98.2 269lbs 4oz 6'  0.5\" 36.01 2.5 98 %          Physical Examination     General: AAO 3, no acute distress, pleasant  HEENT: Normocephalic, atraumatic  Cardiovascular: Regular rate and rhythm without appreciable murmur  Respiratory: Clear to auscultation bilaterally no RRW  Gastrointestinal: Soft nontender nondistended with bowel sounds present  extremities: No clubbing, cyanosis or edema  Neurologic: CN II through XII grossly intact   Psychiatric: Normal mood and affect           Assessment  · Depression     311/F32.9  · Diabetes mellitus, type 2     250.00/E11.9  · Elevated BP without diagnosis of hypertension     796.2/R03.0  · PTSD (post-traumatic stress disorder)     309.81/F43.10  · Anger     799.29/R45.4  · HLD (hyperlipidemia)     272.4/E78.5  · Elevated LFTs     790.6/R79.89    Problems Reconciled  Plan  · Orders  o ACO-39: Current medications updated and reviewed () - - 06/16/2020  · Medications  o Effexor XR 37.5 mg oral capsule,extended release 24hr   SIG: take 1 capsule (37.5 mg) by oral route once daily with food for 90 days   DISP: (90) capsules with 3 refills  Discontinued on 06/16/2020     o Medications have been Reconciled  o Transition of Care or Provider " Policy  · Instructions  o Patient was educated/instructed on their diagnosis, treatment and medications prior to discharge from the clinic today.  o Patient instructed to seek medical attention urgently for new or worsening symptoms.  o Call the office with any concerns or questions.  o Plan as documented above. Patient will have labs done prior to his next appointment. I encouraged him to get these done at his earliest convenience. We will hold off on using Effexor at this time as he has been doing well without it just doing lifestyle changes including regular exercise as well as meditation. If he has any worsening symptoms he is instructed to call or return sooner.  · Disposition  o Follow Up in 1 month.            Electronically Signed by: Mitesh Wiley DO -Author on June 16, 2020 02:09:20 PM

## 2021-05-14 VITALS
HEART RATE: 69 BPM | SYSTOLIC BLOOD PRESSURE: 145 MMHG | DIASTOLIC BLOOD PRESSURE: 86 MMHG | WEIGHT: 259.44 LBS | BODY MASS INDEX: 34.38 KG/M2 | TEMPERATURE: 97.1 F | HEIGHT: 73 IN

## 2021-05-15 VITALS
HEART RATE: 64 BPM | WEIGHT: 255 LBS | OXYGEN SATURATION: 100 % | HEIGHT: 73 IN | TEMPERATURE: 97.6 F | BODY MASS INDEX: 33.8 KG/M2 | DIASTOLIC BLOOD PRESSURE: 82 MMHG | SYSTOLIC BLOOD PRESSURE: 142 MMHG

## 2021-05-15 VITALS
TEMPERATURE: 98.2 F | HEIGHT: 72 IN | DIASTOLIC BLOOD PRESSURE: 78 MMHG | WEIGHT: 269.25 LBS | BODY MASS INDEX: 36.47 KG/M2 | OXYGEN SATURATION: 98 % | HEART RATE: 68 BPM | SYSTOLIC BLOOD PRESSURE: 144 MMHG

## 2021-05-15 VITALS
BODY MASS INDEX: 38.19 KG/M2 | TEMPERATURE: 98.2 F | SYSTOLIC BLOOD PRESSURE: 128 MMHG | OXYGEN SATURATION: 98 % | WEIGHT: 288.12 LBS | HEIGHT: 73 IN | HEART RATE: 62 BPM | DIASTOLIC BLOOD PRESSURE: 78 MMHG

## 2021-05-15 VITALS
HEART RATE: 64 BPM | DIASTOLIC BLOOD PRESSURE: 72 MMHG | SYSTOLIC BLOOD PRESSURE: 127 MMHG | WEIGHT: 308.31 LBS | HEIGHT: 73 IN | BODY MASS INDEX: 40.86 KG/M2

## 2021-05-15 VITALS
HEART RATE: 73 BPM | OXYGEN SATURATION: 99 % | SYSTOLIC BLOOD PRESSURE: 128 MMHG | HEIGHT: 72 IN | DIASTOLIC BLOOD PRESSURE: 72 MMHG | BODY MASS INDEX: 39.6 KG/M2 | WEIGHT: 292.37 LBS | TEMPERATURE: 98.3 F

## 2021-05-15 VITALS
SYSTOLIC BLOOD PRESSURE: 124 MMHG | OXYGEN SATURATION: 99 % | BODY MASS INDEX: 41.92 KG/M2 | DIASTOLIC BLOOD PRESSURE: 76 MMHG | TEMPERATURE: 97.9 F | WEIGHT: 309.5 LBS | HEIGHT: 72 IN | HEART RATE: 84 BPM

## 2021-05-15 VITALS
BODY MASS INDEX: 39.58 KG/M2 | OXYGEN SATURATION: 97 % | WEIGHT: 292.25 LBS | HEART RATE: 87 BPM | HEIGHT: 72 IN | SYSTOLIC BLOOD PRESSURE: 130 MMHG | TEMPERATURE: 98.7 F | DIASTOLIC BLOOD PRESSURE: 82 MMHG

## 2021-05-15 VITALS
OXYGEN SATURATION: 97 % | WEIGHT: 301.5 LBS | DIASTOLIC BLOOD PRESSURE: 64 MMHG | BODY MASS INDEX: 40.84 KG/M2 | SYSTOLIC BLOOD PRESSURE: 118 MMHG | HEART RATE: 77 BPM | HEIGHT: 72 IN | TEMPERATURE: 98.3 F

## 2021-05-15 VITALS
DIASTOLIC BLOOD PRESSURE: 68 MMHG | TEMPERATURE: 97.6 F | HEIGHT: 73 IN | WEIGHT: 300.25 LBS | HEART RATE: 56 BPM | SYSTOLIC BLOOD PRESSURE: 118 MMHG | OXYGEN SATURATION: 99 % | BODY MASS INDEX: 39.79 KG/M2

## 2024-10-04 ENCOUNTER — OFFICE VISIT (OUTPATIENT)
Dept: FAMILY MEDICINE CLINIC | Facility: CLINIC | Age: 39
End: 2024-10-04
Payer: COMMERCIAL

## 2024-10-04 VITALS
RESPIRATION RATE: 20 BRPM | DIASTOLIC BLOOD PRESSURE: 80 MMHG | OXYGEN SATURATION: 97 % | WEIGHT: 256 LBS | SYSTOLIC BLOOD PRESSURE: 130 MMHG | HEART RATE: 86 BPM | BODY MASS INDEX: 33.93 KG/M2 | HEIGHT: 73 IN

## 2024-10-04 DIAGNOSIS — F17.200 TOBACCO USE DISORDER: ICD-10-CM

## 2024-10-04 DIAGNOSIS — Z86.0100 PERSONAL HISTORY OF COLON POLYPS, UNSPECIFIED: ICD-10-CM

## 2024-10-04 DIAGNOSIS — Z76.89 ENCOUNTER TO ESTABLISH CARE: Primary | ICD-10-CM

## 2024-10-04 DIAGNOSIS — F41.1 GENERALIZED ANXIETY DISORDER: ICD-10-CM

## 2024-10-04 DIAGNOSIS — L72.11 PILAR CYST OF SCALP: ICD-10-CM

## 2024-10-04 RX ORDER — SERTRALINE HYDROCHLORIDE 25 MG/1
25 TABLET, FILM COATED ORAL DAILY
Qty: 30 TABLET | Refills: 3 | Status: SHIPPED | OUTPATIENT
Start: 2024-10-04

## 2024-10-04 NOTE — PROGRESS NOTES
Chief complaint: Patient presents today for establishing care.          Patient is a 39 y.o. male who presents for establishing care and concerns for a knot on the back of his neck.     HPI   About 2 and a half months ago, he noticed a bump on the back of his neck, he thought it was a mosquito bite.  It continued to grow in size till it was about the size of a golf ball, felt pressure in his head/headaches.   His fiance noticed hair loss around the area.  Used hot washcloth on the area, which provided him with relief.   The next day, purulent material and blood was able to be expressed, he had resolution of the pressure and pain.   A week and a half later, the area began to grow in size again, material was able to be expressed, it went down in size again.  This has happened about four times total over the last 2 and a half months.     Pertinent history of TBI. Does get severe headaches due to TBI.  History of mood disorders, not currently on any medications but has been on medications in the past.   Wants to see how he does being in a new environment as he is currently feeling better. He was in an environment where he was very unhappy and therefore took medications.     Has had an EGD and colonoscopy [place]  [place] , reports they told him he had polyps, but he lost his insurance, therefore, he is unsure when he was due for follow up/recommendations.   Denies concerns with bowel movements, regularly has bowel movements, denies blood in stool.  Reports night sweats occasionally and some fatigue/exhaustion.  Has lost weight recently but attributes this to increased walking due to new job.     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 Work is supportive/flexible  - Depression: has some concerns for anxiety, worries about general things, reports he made himself seek help by seeing a therapist in the past, which helped. He also took buspirone, he did decent on this but had dark thoughts, therefore he discontinued it.     - Substance Use: sober since 2008, history of pain pill abuse, cocaine, marijuana   -No family history of colon or prostate cancer.                         /80   Pulse 86   Resp 20   Ht 185.4 cm (72.99\")   Wt 116 kg (256 lb)   SpO2 97%   BMI 33.78 kg/m²       GEN: In no acute distress, non toxic appearing  HEENT: Bilateral EACs clear, TMs of normal healthy appearance, middle ear spaces are clear. Mucous membranes moist. Oropharynx without erythema or exudate. No cervical or submandibular lymphadenopathy.  CV: Regular rate and rhythm, no murmurs, 2+ peripheral pulses, No extremity edema.   RESP: Lungs clear to auscultation anteriorly and posteriorly in all lung fields bilaterally.  SKIN: about 3cm x 1.5cm erythematous nonfluctuant papule on posterior aspect of head at occipital region associated with hair loss  PSYCH: Affect normal, insight fair    PHQ-2 Depression Screening  Little interest or pleasure in doing things? 0-->not at all   Feeling down, depressed, or hopeless? 0-->not at all   PHQ-2 Total Score 0                Diagnoses and all orders for this visit:    1. Encounter to establish care (Primary)  -     CBC Auto Differential; Future  -     Comprehensive Metabolic Panel; Future  -     Hemoglobin A1c; Future  -     Lipid Panel; Future    2. Tobacco use disorder  Discussed the importance of discontinuing tobacco    3. Personal history of colon polyps, unspecified  -     Ambulatory Referral to Gastroenterology    4. Pilar cyst of scalp  -     Ambulatory Referral to Dermatology    5. Generalized anxiety disorder  -     " sertraline (Zoloft) 25 MG tablet; Take 1 tablet by mouth Daily.  Dispense: 30 tablet; Refill: 3  Counselor sheet was also given as he had benefit from seeing a counselor in the past  We will follow-up in 2 months after he starts the sertraline to ensure this is providing him with adequate relief.    We also discussed the importance of increasing his water intake, drinking less energy drinks and juice.  He will also work on incorporating more fruits and vegetables in his diet, as well as eating less processed foods.    HM:  Colorectal Screening:  referred to GI today as he has a history of a colonoscopy and reports they removed polyps.  PSA(Over age 50):  N/A (consider at age 45, due to race--)  US Aorta (For male smokers, age 65):  N/A  CT for Smoker (Age 50-80, 20pk yr):  N/A  Hep C: One time screening unless high risk, previously had negative screening     Screening Labs & Tests:  CBC, CMP, A1C, lipid panel  HEP C: Previously had negative screen     Immunization/Vaccinations  Influenza: Recommended annual influenza vaccine  Tetanus/Pertussis: Recommended   Pneumovax: Not needed at this time  Shingles: Not needed at this time  COVID: Recommended      Lifestyle Counseling:  Lifestyle Modifications: Continue good lifestyle choices/modifications, Encouraged diet primarily of whole foods, decrease processed / packaged foods.  Reduce exposure to stress if possible.  Goal 150 minutes of moderate intensity exercise per week.  Decrease screen time.    Recommended annual dental/vision examination.    Follow up: Return in about 2 months (around 12/4/2024) for Recheck.  Plan of care discussed with pt. They verbalized understanding and agreement.

## 2024-10-11 ENCOUNTER — LAB (OUTPATIENT)
Dept: FAMILY MEDICINE CLINIC | Facility: CLINIC | Age: 39
End: 2024-10-11
Payer: COMMERCIAL

## 2024-10-11 DIAGNOSIS — Z76.89 ENCOUNTER TO ESTABLISH CARE: ICD-10-CM

## 2024-10-11 LAB
ALBUMIN SERPL-MCNC: 4.4 G/DL (ref 3.5–5.2)
ALBUMIN/GLOB SERPL: 1.6 G/DL
ALP SERPL-CCNC: 245 U/L (ref 39–117)
ALT SERPL W P-5'-P-CCNC: 37 U/L (ref 1–41)
ANION GAP SERPL CALCULATED.3IONS-SCNC: 11.2 MMOL/L (ref 5–15)
AST SERPL-CCNC: 21 U/L (ref 1–40)
BASOPHILS # BLD AUTO: 0.09 10*3/MM3 (ref 0–0.2)
BASOPHILS NFR BLD AUTO: 1 % (ref 0–1.5)
BILIRUB SERPL-MCNC: 0.3 MG/DL (ref 0–1.2)
BUN SERPL-MCNC: 15 MG/DL (ref 6–20)
BUN/CREAT SERPL: 14.2 (ref 7–25)
CALCIUM SPEC-SCNC: 9.6 MG/DL (ref 8.6–10.5)
CHLORIDE SERPL-SCNC: 99 MMOL/L (ref 98–107)
CHOLEST SERPL-MCNC: 183 MG/DL (ref 0–200)
CO2 SERPL-SCNC: 25.8 MMOL/L (ref 22–29)
CREAT SERPL-MCNC: 1.06 MG/DL (ref 0.76–1.27)
DEPRECATED RDW RBC AUTO: 39.7 FL (ref 37–54)
EGFRCR SERPLBLD CKD-EPI 2021: 92.7 ML/MIN/1.73
EOSINOPHIL # BLD AUTO: 0.33 10*3/MM3 (ref 0–0.4)
EOSINOPHIL NFR BLD AUTO: 3.7 % (ref 0.3–6.2)
ERYTHROCYTE [DISTWIDTH] IN BLOOD BY AUTOMATED COUNT: 12.3 % (ref 12.3–15.4)
GLOBULIN UR ELPH-MCNC: 2.8 GM/DL
GLUCOSE SERPL-MCNC: 396 MG/DL (ref 65–99)
HBA1C MFR BLD: 11.8 % (ref 4.8–5.6)
HCT VFR BLD AUTO: 51.6 % (ref 37.5–51)
HDLC SERPL-MCNC: 21 MG/DL (ref 40–60)
HGB BLD-MCNC: 17.3 G/DL (ref 13–17.7)
IMM GRANULOCYTES # BLD AUTO: 0.04 10*3/MM3 (ref 0–0.05)
IMM GRANULOCYTES NFR BLD AUTO: 0.5 % (ref 0–0.5)
LDLC SERPL CALC-MCNC: 83 MG/DL (ref 0–100)
LDLC/HDLC SERPL: 3.08 {RATIO}
LYMPHOCYTES # BLD AUTO: 2.56 10*3/MM3 (ref 0.7–3.1)
LYMPHOCYTES NFR BLD AUTO: 28.9 % (ref 19.6–45.3)
MCH RBC QN AUTO: 30.2 PG (ref 26.6–33)
MCHC RBC AUTO-ENTMCNC: 33.5 G/DL (ref 31.5–35.7)
MCV RBC AUTO: 90.2 FL (ref 79–97)
MONOCYTES # BLD AUTO: 0.64 10*3/MM3 (ref 0.1–0.9)
MONOCYTES NFR BLD AUTO: 7.2 % (ref 5–12)
NEUTROPHILS NFR BLD AUTO: 5.19 10*3/MM3 (ref 1.7–7)
NEUTROPHILS NFR BLD AUTO: 58.7 % (ref 42.7–76)
NRBC BLD AUTO-RTO: 0 /100 WBC (ref 0–0.2)
PLATELET # BLD AUTO: 158 10*3/MM3 (ref 140–450)
PMV BLD AUTO: 11.3 FL (ref 6–12)
POTASSIUM SERPL-SCNC: 3.9 MMOL/L (ref 3.5–5.2)
PROT SERPL-MCNC: 7.2 G/DL (ref 6–8.5)
RBC # BLD AUTO: 5.72 10*6/MM3 (ref 4.14–5.8)
SODIUM SERPL-SCNC: 136 MMOL/L (ref 136–145)
TRIGL SERPL-MCNC: 487 MG/DL (ref 0–150)
VLDLC SERPL-MCNC: 79 MG/DL (ref 5–40)
WBC NRBC COR # BLD AUTO: 8.85 10*3/MM3 (ref 3.4–10.8)

## 2024-10-11 PROCEDURE — 80053 COMPREHEN METABOLIC PANEL: CPT | Performed by: PHYSICIAN ASSISTANT

## 2024-10-11 PROCEDURE — 80061 LIPID PANEL: CPT | Performed by: PHYSICIAN ASSISTANT

## 2024-10-11 PROCEDURE — 36415 COLL VENOUS BLD VENIPUNCTURE: CPT

## 2024-10-11 PROCEDURE — 85025 COMPLETE CBC W/AUTO DIFF WBC: CPT | Performed by: PHYSICIAN ASSISTANT

## 2024-10-11 PROCEDURE — 83036 HEMOGLOBIN GLYCOSYLATED A1C: CPT | Performed by: PHYSICIAN ASSISTANT

## 2024-10-15 DIAGNOSIS — E11.65 TYPE 2 DIABETES MELLITUS WITH HYPERGLYCEMIA, WITHOUT LONG-TERM CURRENT USE OF INSULIN: Primary | ICD-10-CM

## 2024-12-06 ENCOUNTER — OFFICE VISIT (OUTPATIENT)
Dept: FAMILY MEDICINE CLINIC | Facility: CLINIC | Age: 39
End: 2024-12-06
Payer: COMMERCIAL

## 2024-12-06 VITALS
OXYGEN SATURATION: 98 % | RESPIRATION RATE: 20 BRPM | BODY MASS INDEX: 33.8 KG/M2 | DIASTOLIC BLOOD PRESSURE: 80 MMHG | HEART RATE: 75 BPM | WEIGHT: 255 LBS | HEIGHT: 73 IN | SYSTOLIC BLOOD PRESSURE: 140 MMHG

## 2024-12-06 DIAGNOSIS — J06.9 VIRAL URI: ICD-10-CM

## 2024-12-06 DIAGNOSIS — E11.65 TYPE 2 DIABETES MELLITUS WITH HYPERGLYCEMIA, WITHOUT LONG-TERM CURRENT USE OF INSULIN: ICD-10-CM

## 2024-12-06 DIAGNOSIS — F17.200 TOBACCO USE DISORDER: ICD-10-CM

## 2024-12-06 DIAGNOSIS — F41.1 GENERALIZED ANXIETY DISORDER: Primary | ICD-10-CM

## 2024-12-06 PROCEDURE — 99214 OFFICE O/P EST MOD 30 MIN: CPT | Performed by: PHYSICIAN ASSISTANT

## 2024-12-06 NOTE — PROGRESS NOTES
"Chief Complaint  Chief Complaint   Patient presents with    Cyst     Follow up - saw derm    Cough     drainage    Diarrhea       Subjective        Mustapha Pagan is a 39 y.o. male who presents to [facility].  History of Present Illness  Presents today for follow-up on anxiety, diabetes, and other concerns.    Reports the sertraline made him significantly fatigued and nauseous and did not feel much improvement.  His coworkers told him he seemed as if he was having delayed thinking therefore he discontinued the sertraline.  He continues to have stress, but does not excessively worry.   Sometimes thinks of the worst case scenario.   Reports previously he was in an unfavorable environment that caused a lot of stress/anxiety, therefore he was on medications.  He is now in a better environment and , believes he does not need medications at this time and may benefit from counseling as he had benefit from counseling in the past.   He denies thoughts to hurt himself, others, SI, or AVH.    His A1c was elevated at 11.8, he has not yet picked up the metformin as he could not afford it.  He has replaced energy drinks with black coffee, soft drinks with zero sugar soft drinks.   He has tried eating more vegetables, not many fruits, trying to eat less processed/fast foods.     About a week and a half ago he began having increased post nasal drainage and nasal congestion.  A week ago, he began having a cough due to PND.   He started taking Benadryl, Dayquil (helped with cough), and Mucinex which has helped thin the phlegm/he can cough easier, but otherwise has not helped much.  Increased PND has caused some nausea.   PND is clear.   His symptoms are worse in the morning and at night, but then improve throughout the day.    Objective   /80 (BP Location: Left arm)   Pulse 75   Resp 20   Ht 185.4 cm (72.99\")   Wt 116 kg (255 lb)   SpO2 98%   BMI 33.65 kg/m²     Estimated body " "mass index is 33.65 kg/m² as calculated from the following:    Height as of this encounter: 185.4 cm (72.99\").    Weight as of this encounter: 116 kg (255 lb).     Physical Exam   GEN: In no acute distress, non toxic appearing  HEENT: Bilateral EACs clear, TMs of normal healthy appearance, middle ear spaces are clear.  Nasal airways reveal clear drainage and inferior turban hypertrophy.  Mucous membranes moist. Oropharynx without erythema or exudate. No cervical or submandibular lymphadenopathy.  CV: Regular rate and rhythm, no murmurs, 2+ peripheral pulses, No extremity edema.   RESP: Lungs clear to auscultation anteriorly and posteriorly in all lung fields bilaterally.  PSYCH: Affect normal, insight fair         Result Review :              Assessment and Plan     Diagnoses and all orders for this visit:    1. Generalized anxiety disorder (Primary)  Discussed different medications can be tried, however, upon discussion, he believes he would benefit more from counseling.  Counselor sheet has been given.  Discussed benefits of having a good social [place] and regular exercise.    2. Type 2 diabetes mellitus with hyperglycemia, without long-term current use of insulin  -     metFORMIN (GLUCOPHAGE) 500 MG tablet; Take 1 tablet by mouth Daily With Breakfast. For 1 week, then increase to taking 1 tab by mouth twice daily.  Dispense: 60 tablet; Refill: 3  Encouraged him to work on increasing water intake, decreasing soft drink intake, incorporating more vegetables, whole foods, and less processed foods into his diet.  Encouraged regular exercise.  We will follow-up in 3 months to ensure he is doing well with the metformin and recheck A1c.    3. Viral URI  Discussed his symptoms are most consistent with viral URI as he has clear PND, is afebrile, and physical exam is fairly unremarkable.  Encouraged him to adhere to symptomatic and supportive care with nasal steroid spray such as Nasacort, antihistamine such as Zyrtec, " Tylenol or ibuprofen as needed, and DayQuil and Mucinex as needed.    4. Tobacco use disorder  Encouraged him to work on tobacco cessation which he states he is trying to do.    He is in agreement this plan and will call with any issues or concerns in the meantime.      Follow Up     Return in about 3 months (around 3/6/2025) for Recheck.

## 2025-07-10 ENCOUNTER — LAB (OUTPATIENT)
Dept: FAMILY MEDICINE CLINIC | Facility: CLINIC | Age: 40
End: 2025-07-10
Payer: COMMERCIAL

## 2025-07-10 ENCOUNTER — OFFICE VISIT (OUTPATIENT)
Dept: FAMILY MEDICINE CLINIC | Facility: CLINIC | Age: 40
End: 2025-07-10
Payer: COMMERCIAL

## 2025-07-10 ENCOUNTER — TELEPHONE (OUTPATIENT)
Dept: FAMILY MEDICINE CLINIC | Facility: CLINIC | Age: 40
End: 2025-07-10

## 2025-07-10 ENCOUNTER — HOSPITAL ENCOUNTER (OUTPATIENT)
Dept: GENERAL RADIOLOGY | Facility: HOSPITAL | Age: 40
Discharge: HOME | End: 2025-07-10
Admitting: PHYSICIAN ASSISTANT
Payer: COMMERCIAL

## 2025-07-10 VITALS
SYSTOLIC BLOOD PRESSURE: 151 MMHG | HEIGHT: 73 IN | OXYGEN SATURATION: 98 % | BODY MASS INDEX: 31.44 KG/M2 | DIASTOLIC BLOOD PRESSURE: 83 MMHG | HEART RATE: 79 BPM | RESPIRATION RATE: 18 BRPM | WEIGHT: 237.2 LBS

## 2025-07-10 DIAGNOSIS — M25.512 ACUTE PAIN OF LEFT SHOULDER: ICD-10-CM

## 2025-07-10 DIAGNOSIS — L84 FOOT CALLUS: ICD-10-CM

## 2025-07-10 DIAGNOSIS — L72.11 PILAR CYST OF SCALP: ICD-10-CM

## 2025-07-10 DIAGNOSIS — M25.512 ACUTE PAIN OF LEFT SHOULDER: Primary | ICD-10-CM

## 2025-07-10 DIAGNOSIS — E11.65 TYPE 2 DIABETES MELLITUS WITH HYPERGLYCEMIA, WITHOUT LONG-TERM CURRENT USE OF INSULIN: ICD-10-CM

## 2025-07-10 LAB — HBA1C MFR BLD: 11.7 % (ref 4.8–5.6)

## 2025-07-10 PROCEDURE — 83036 HEMOGLOBIN GLYCOSYLATED A1C: CPT | Performed by: PHYSICIAN ASSISTANT

## 2025-07-10 PROCEDURE — 73030 X-RAY EXAM OF SHOULDER: CPT

## 2025-07-10 PROCEDURE — 36415 COLL VENOUS BLD VENIPUNCTURE: CPT | Performed by: PHYSICIAN ASSISTANT

## 2025-07-10 NOTE — PROGRESS NOTES
"Chief Complaint  Chief Complaint   Patient presents with    Shoulder Pain     Left shoulder pain    Fatigue    Foot Pain     Left foot pain shooting up leg       Subjective        Mustapha Pagan is a 40 y.o. male who presents to [facility].  History of Present Illness  Presents today for concerns of left foot pain radiating up into his left leg, left shoulder popping, and other concerns.    When he was about 17yo, stepped on a nail on the ball of his left foot, received Tdap and treated appropriately.  Continues to have intermittent pressure/sharp pains with walking on his left foot, with the right amount of pressure he has pain shoot up his left leg.  If he walks more on his heel and avoids applying pressure to the area, does not have pain.     Also began having left shoulder popping/cracking for the last 6 weeks.  Lifted heavy object in mid-April and has had this since.  Occasionally has left shoulder pain.   Has jolt of pain down left arm when left shoulder pops.  Denies previous injury to left shoulder or left shoulder/arm weakness.     Not taking metformin, states he was unable to  when it was prescribed.  Trying to reduce amount of sugar in diet.     Concerns for cyst on left posterior aspect of head.  Had seen dermatology for previous cyst, they had referred him to ENT for referral but he did not attend appointment as the cyst resolved.  Reports new cyst appeared within the last several weeks.    Objective   /83   Pulse 79   Resp 18   Ht 185.4 cm (72.99\")   Wt 108 kg (237 lb 3.2 oz)   SpO2 98%   BMI 31.30 kg/m²     Estimated body mass index is 31.3 kg/m² as calculated from the following:    Height as of this encounter: 185.4 cm (72.99\").    Weight as of this encounter: 108 kg (237 lb 3.2 oz).     Physical Exam   GEN: In no acute distress, non toxic appearing  SKIN: About 2 cm X 1 cm nonfluctuant papule on left posterior aspect of head.  About 1 cm X 1 cm " area of yellow thickened skin on plantar aspect of ball of left foot, no fluctuance noted  MSK: Superior aspect of left shoulder TTP, crepitus with left shoulder extension.  Exhibits good ROM of left shoulder.  Empty can test negative but painful.  Resisted internal/external rotation negative but painful.  No joint erythema, deformity, or effusion. Good ROM in upper and lower extremities.  NEURO: AAO to person, place, and time. Strength 5/5 in BUE. Sensation to light touch intact in BUE.   PSYCH: Affect normal, insight fair     PHQ-2 Depression Screening  Little interest or pleasure in doing things? Not at all   Feeling down, depressed, or hopeless? Not at all   PHQ-2 Total Score 0         Result Review :              Assessment and Plan     Assessment & Plan  Acute pain of left shoulder  Left shoulder x-rays been ordered for further evaluation.  Referral to physical therapy has also been made.  Encouraged him to apply ice, rest, take OTC pain medications as needed.  Orders:    XR Shoulder 2+ View Left; Future    Ambulatory Referral to Physical Therapy for Evaluation & Treatment    Type 2 diabetes mellitus with hyperglycemia, without long-term current use of insulin  Discussed the importance of taking metformin as directed.  Will recheck A1c today, pending on A1c, may send in second medication such as Mounjaro or Ozempic.  Orders:    metFORMIN (GLUCOPHAGE) 500 MG tablet; Take 1 tablet by mouth Daily With Breakfast for 7 days, THEN 1 tablet 2 (Two) Times a Day With Meals for 7 days, THEN 2 tablets 2 (Two) Times a Day With Meals for 90 days.    Hemoglobin A1c    Foot callus  Provided reassurance that the area of concern on his left foot is a callus.  Discussed for him to gently reduce the size of the callus with a pumice stone, wear good supportive shoes, and avoid walking barefoot.  Discussed for him to be very cautious with reducing the size of the callus as he is diabetic, avoid going too deep.       Pilar cyst of  scalp  Referral to ENT has been made as dermatology had also requested him to see an ENT for further evaluation of the cyst on his scalp.  Orders:    Ambulatory Referral to ENT (Otolaryngology)    He will be called with results of labs, follow-up will pend on this.  He is in agreement with this plan and will call should his symptoms worsen or not improve.       Follow Up     Return if symptoms worsen or fail to improve.

## 2025-07-11 ENCOUNTER — RESULTS FOLLOW-UP (OUTPATIENT)
Dept: FAMILY MEDICINE CLINIC | Facility: CLINIC | Age: 40
End: 2025-07-11
Payer: COMMERCIAL

## 2025-07-11 DIAGNOSIS — E11.65 TYPE 2 DIABETES MELLITUS WITH HYPERGLYCEMIA, WITHOUT LONG-TERM CURRENT USE OF INSULIN: Primary | ICD-10-CM

## 2025-07-11 RX ORDER — SEMAGLUTIDE 1.34 MG/ML
0.25 INJECTION, SOLUTION SUBCUTANEOUS WEEKLY
Qty: 1.5 ML | Refills: 0 | Status: SHIPPED | OUTPATIENT
Start: 2025-07-11

## 2025-07-11 NOTE — TELEPHONE ENCOUNTER
"    Name: Mustapha Pagan II \"Will\"    Relationship: Self    Best Callback Number: [phone]     HUB PROVIDED THE RELAY MESSAGE FROM THE OFFICE   PATIENT VOICED UNDERSTANDING AND HAS NO FURTHER QUESTIONS AT THIS TIME    ADDITIONAL INFORMATION:   "

## 2025-07-11 NOTE — TELEPHONE ENCOUNTER
Hub to relay:  I called Mustapha Pagan and NATALIYA for patient to call our office back for these results.     Please call patient to let him know his A1c is still significantly elevated at 11.7. He needs to take the metformin that was prescribed yesterday. I have also sent in an injectable medication that will greatly help lower his blood sugar levels. He needs to take this weekly, improve diet, and increase exercise. Follow-up in 3 months to recheck A1c.

## 2025-07-14 DIAGNOSIS — R53.83 FATIGUE, UNSPECIFIED TYPE: Primary | ICD-10-CM

## 2025-07-14 DIAGNOSIS — R68.82 DECREASED LIBIDO: ICD-10-CM

## 2025-07-14 NOTE — TELEPHONE ENCOUNTER
Left message notifying patient of xray results and recommendations.  I asked that the patient call us back to let us know if he'd like to proceed with PT, or not.

## 2025-07-17 NOTE — TELEPHONE ENCOUNTER
I spoke with patient and he has an appointment scheduled with pt and I transferred him to appointments to have lab done.

## 2025-07-18 ENCOUNTER — TELEPHONE (OUTPATIENT)
Dept: LAB | Facility: HOSPITAL | Age: 40
End: 2025-07-18
Payer: COMMERCIAL

## 2025-07-18 ENCOUNTER — LAB (OUTPATIENT)
Dept: FAMILY MEDICINE CLINIC | Facility: CLINIC | Age: 40
End: 2025-07-18
Payer: COMMERCIAL

## 2025-07-18 DIAGNOSIS — R68.82 DECREASED LIBIDO: ICD-10-CM

## 2025-07-18 DIAGNOSIS — R53.83 FATIGUE, UNSPECIFIED TYPE: ICD-10-CM

## 2025-07-18 PROCEDURE — 84403 ASSAY OF TOTAL TESTOSTERONE: CPT | Performed by: PHYSICIAN ASSISTANT

## 2025-07-18 PROCEDURE — 84402 ASSAY OF FREE TESTOSTERONE: CPT | Performed by: PHYSICIAN ASSISTANT

## 2025-07-18 PROCEDURE — 36415 COLL VENOUS BLD VENIPUNCTURE: CPT

## 2025-07-22 LAB
TESTOST FREE SERPL-MCNC: 9.9 PG/ML (ref 8.7–25.1)
TESTOST SERPL-MCNC: 573 NG/DL (ref 264–916)